# Patient Record
(demographics unavailable — no encounter records)

---

## 2024-11-27 NOTE — PHYSICAL EXAM
[FreeTextEntry1] : No hoarseness.  [de-identified] : Thyroid gland normal size, no palpable nodules.  [] : septum deviated to the right [de-identified] : Mild inferior turbinate hypertrophy, smaller after decongestant, and breathing subjectively better. [Normal] : no rashes [de-identified] : Carotid pulses 2+ bilateral.

## 2024-11-27 NOTE — CONSULT LETTER
[Dear  ___] : Dear  [unfilled], [( Thank you for referring [unfilled] for consultation for _____ )] : Thank you for referring [unfilled] for consultation for [unfilled] [Please see my note below.] : Please see my note below. [Consult Closing:] : Thank you very much for allowing me to participate in the care of this patient.  If you have any questions, please do not hesitate to contact me. [Sincerely,] : Sincerely, [FreeTextEntry2] : Danny Hernández  [facility] [place] [address] FAX [phone]   [FreeTextEntry3] :  Cydney Cabello MD  Otolaryngology, Head and Neck Surgery

## 2024-11-27 NOTE — ASSESSMENT
[FreeTextEntry1] : Ms. DANG is a 34-year-old woman who was diagnosed with right ear infection, treated iwth oral antibiotics, in early October 2024.  On exam today, the ear canals, tympanic membranes and middle ears appear normal bilaterally.  Her temporoparietal headaches, which started in September, have resolved after a month of topirimate, so c/w migraine variant.  Chronic nasal congestion on right, due to mild deviated nasal septum and inferior turbinate hypertrophy; improved when she takes Flonase, which I encouraged her to continue.  Surgical correction of the deviated nasal septum and inferior turbinate hypertrophy can be done, but she is not interested.

## 2024-11-27 NOTE — HISTORY OF PRESENT ILLNESS
[de-identified] : Ms. DANG is a 34-year-old woman who was referred by PETER HERNÁNDEZ for an ear infection. In September, she began having right temporoparietal headaches that were constant.  Everything (light, noise, movement) seemed to make the headache worse.  No N/V, vision change or aura.   No preceding illness or head trauma.  No bruxism or TMJ issues.   She went to hospital twice, but no etiology was found.  CT head was normal, per her report.   In early October, she saw PETER Hernández who diagnosed a right ear infection, treated with antibiotics.  She had been having random ear pain, but no d/c, tinnitus, HL or vertigo.  She was also started on topiramate 25 mg.  She felt no change in the headache after antibiotics, so referred to ENT.  Topirimate was increased to 50 m daily.  Headaches finally went away about 3 weeks ago. No prior history of ear infections. She has chronic nasal congestion on right side, helped by Flonase, which she uses PRN.  No postnasal drip, rhinorrhea or facial pressure/pain.